# Patient Record
Sex: MALE | Race: WHITE | Employment: FULL TIME | URBAN - METROPOLITAN AREA
[De-identification: names, ages, dates, MRNs, and addresses within clinical notes are randomized per-mention and may not be internally consistent; named-entity substitution may affect disease eponyms.]

---

## 2022-08-10 LAB
AVERAGE GLUCOSE: NORMAL
HBA1C MFR BLD: 6.8 %

## 2022-09-09 ENCOUNTER — INITIAL CONSULT (OUTPATIENT)
Dept: CARDIOLOGY CLINIC | Age: 67
End: 2022-09-09
Payer: MEDICARE

## 2022-09-09 VITALS
SYSTOLIC BLOOD PRESSURE: 140 MMHG | BODY MASS INDEX: 31.98 KG/M2 | HEART RATE: 86 BPM | WEIGHT: 211 LBS | HEIGHT: 68 IN | DIASTOLIC BLOOD PRESSURE: 80 MMHG

## 2022-09-09 DIAGNOSIS — Z76.89 ESTABLISHING CARE WITH NEW DOCTOR, ENCOUNTER FOR: Primary | ICD-10-CM

## 2022-09-09 DIAGNOSIS — E78.5 HYPERLIPIDEMIA, UNSPECIFIED HYPERLIPIDEMIA TYPE: ICD-10-CM

## 2022-09-09 DIAGNOSIS — R07.9 CHEST PAIN, UNSPECIFIED TYPE: ICD-10-CM

## 2022-09-09 DIAGNOSIS — I10 PRIMARY HYPERTENSION: ICD-10-CM

## 2022-09-09 DIAGNOSIS — E11.9 TYPE 2 DIABETES MELLITUS WITHOUT COMPLICATION, WITHOUT LONG-TERM CURRENT USE OF INSULIN (HCC): ICD-10-CM

## 2022-09-09 DIAGNOSIS — R01.1 MURMUR, CARDIAC: ICD-10-CM

## 2022-09-09 DIAGNOSIS — R06.09 DOE (DYSPNEA ON EXERTION): ICD-10-CM

## 2022-09-09 PROCEDURE — 3044F HG A1C LEVEL LT 7.0%: CPT | Performed by: INTERNAL MEDICINE

## 2022-09-09 PROCEDURE — 1123F ACP DISCUSS/DSCN MKR DOCD: CPT | Performed by: INTERNAL MEDICINE

## 2022-09-09 PROCEDURE — 99203 OFFICE O/P NEW LOW 30 MIN: CPT | Performed by: INTERNAL MEDICINE

## 2022-09-09 PROCEDURE — 93000 ELECTROCARDIOGRAM COMPLETE: CPT | Performed by: INTERNAL MEDICINE

## 2022-09-09 RX ORDER — DEXTROAMPHETAMINE SACCHARATE, AMPHETAMINE ASPARTATE, DEXTROAMPHETAMINE SULFATE AND AMPHETAMINE SULFATE 7.5; 7.5; 7.5; 7.5 MG/1; MG/1; MG/1; MG/1
15 TABLET ORAL DAILY
COMMUNITY

## 2022-09-09 RX ORDER — ATORVASTATIN CALCIUM 80 MG/1
80 TABLET, FILM COATED ORAL DAILY
COMMUNITY

## 2022-09-09 RX ORDER — ASPIRIN 81 MG/1
81 TABLET, CHEWABLE ORAL DAILY
COMMUNITY

## 2022-09-09 RX ORDER — LISINOPRIL 10 MG/1
10 TABLET ORAL DAILY
COMMUNITY

## 2022-09-09 RX ORDER — VALACYCLOVIR HYDROCHLORIDE 500 MG/1
500 TABLET, FILM COATED ORAL PRN
COMMUNITY

## 2022-09-09 ASSESSMENT — ENCOUNTER SYMPTOMS
BLURRED VISION: 0
DIARRHEA: 0
COLOR CHANGE: 0
HEMATOCHEZIA: 0
HOARSE VOICE: 0
ABDOMINAL PAIN: 0
SPUTUM PRODUCTION: 0
SWOLLEN GLANDS: 0
NAUSEA: 0
SHORTNESS OF BREATH: 1
BOWEL INCONTINENCE: 0
WHEEZING: 0
COUGH: 0
HEMATEMESIS: 0
VOMITING: 0
ORTHOPNEA: 0
BACK PAIN: 0
RHINORRHEA: 0
SORE THROAT: 0
HEMOPTYSIS: 0

## 2022-09-09 NOTE — PROGRESS NOTES
[facility] CARDIOLOGY  [address], [address]  PHONE: [phone]        22    NAME:  Melanie Blanchard  : 1955  MRN: [id number]       SUBJECTIVE:   Melanie Blanchard is a 78 y.o. male seen for a consultation visit regarding the following: The patient is referred by Dr Yesenia Howe for evaluation of chest pain and LANDRUM. He reports symptoms began after COVID infection in 2022 and may have worsened after his wife death in 2022. Recent echo at [facility] on 22 describes normal LV EF(55-65%),mild LVH,aortic sclerosis ,and trace MR &TR. Chief Complaint   Patient presents with    Consultation    Shortness of Breath    Chest Pain            HPI:  Consultation is requested by [unfilled] for evaluation of Consultation, Shortness of Breath, and Chest Pain   . Shortness of Breath  This is a new problem. Episode onset: 2022. Post COVID. The problem occurs every several days. The problem has been unchanged. Duration: variable. Associated symptoms include chest pain. Pertinent negatives include no abdominal pain, claudication, coryza, ear pain, fever, headaches, hemoptysis, leg pain, leg swelling, neck pain, orthopnea, PND, rash, rhinorrhea, sore throat, sputum production, swollen glands, syncope, vomiting or wheezing. The symptoms are aggravated by exercise. Chest Pain   This is a new problem. Episode onset: 2022. Post COVID. The onset quality is gradual. The problem occurs intermittently. The problem has been unchanged. The pain is present in the substernal region. The pain is at a severity of 1/10. The pain is mild. Quality: ache. The pain does not radiate. Associated symptoms include exertional chest pressure, malaise/fatigue and shortness of breath.  Pertinent negatives include no abdominal pain, back pain, claudication, cough, diaphoresis, dizziness, fever, headaches, hemoptysis, irregular heartbeat, leg pain, lower extremity edema, nausea, near-syncope, numbness, orthopnea, palpitations, PND, sputum production, syncope, vomiting or weakness. The pain is aggravated by exertion. He has tried nothing for the symptoms. Past Medical History, Past Surgical History, Family history, Social History, and Medications were all reviewed with the patient today and updated as necessary. Allergies   Allergen Reactions    Penicillins        Current Outpatient Medications:     metFORMIN (GLUCOPHAGE) 500 MG tablet, Take 500 mg by mouth 2 times daily (with meals), Disp: , Rfl:     diclofenac (VOLTAREN) 50 MG EC tablet, Take 50 mg by mouth 3 times daily, Disp: , Rfl:     aspirin 81 MG chewable tablet, Take 81 mg by mouth daily, Disp: , Rfl:     lisinopril (PRINIVIL;ZESTRIL) 10 MG tablet, Take 10 mg by mouth daily, Disp: , Rfl:     amphetamine-dextroamphetamine (ADDERALL) 30 MG tablet, Take 15 mg by mouth daily. , Disp: , Rfl:     valACYclovir (VALTREX) 500 MG tablet, Take 500 mg by mouth as needed, Disp: , Rfl:     atorvastatin (LIPITOR) 80 MG tablet, Take 80 mg by mouth daily, Disp: , Rfl:   Past Medical History:   Diagnosis Date    Diabetes mellitus ([facility] [address]     Hyperlipidemia     Hypertension      Past Surgical History:   Procedure Laterality Date    JOINT REPLACEMENT Right     SHOULDER ARTHROPLASTY Right      No family history on file. Social History     Tobacco Use    Smoking status: Not on file    Smokeless tobacco: Never   Substance Use Topics    Alcohol use: Not Currently       ROS:    Review of Systems   Constitutional: Positive for malaise/fatigue. Negative for chills, decreased appetite, diaphoresis and fever. HENT:  Negative for congestion, ear pain, hearing loss, hoarse voice, nosebleeds, rhinorrhea and sore throat. Eyes:  Negative for blurred vision. Cardiovascular:  Positive for chest pain and dyspnea on exertion.  Negative for claudication, cyanosis, irregular heartbeat, leg swelling, near-syncope, orthopnea, palpitations, paroxysmal nocturnal dyspnea and syncope. Respiratory:  Positive for shortness of breath. Negative for cough, hemoptysis, sputum production and wheezing. Endocrine: Negative for polydipsia, polyphagia and polyuria. Skin:  Negative for color change and rash. Musculoskeletal:  Negative for back pain and neck pain. Gastrointestinal:  Negative for abdominal pain, bowel incontinence, diarrhea, hematemesis, hematochezia, nausea and vomiting. Genitourinary:  Negative for dysuria, frequency and hematuria. Neurological:  Negative for dizziness, focal weakness, headaches, light-headedness, loss of balance, numbness, sensory change and weakness. Psychiatric/Behavioral:  Negative for altered mental status and memory loss. PHYSICAL EXAM:   BP (!) 140/80   Pulse 86   Ht 5' 8\" (1.727 m)   Wt 211 lb (95.7 kg)   BMI 32.08 kg/m²      Physical Exam  Constitutional:       Appearance: Normal appearance. HENT:      Head: Normocephalic and atraumatic. Nose: Nose normal.   Eyes:      Extraocular Movements: Extraocular movements intact. Pupils: Pupils are equal, round, and reactive to light. Neck:      Vascular: No carotid bruit. Cardiovascular:      Rate and Rhythm: Regular rhythm. Pulses: Normal pulses. Heart sounds: Murmur (1/6 MATT) heard. Pulmonary:      Effort: Pulmonary effort is normal.      Breath sounds: Normal breath sounds. Abdominal:      General: Abdomen is flat. Bowel sounds are normal.      Palpations: Abdomen is soft. Musculoskeletal:         General: Normal range of motion. Cervical back: Normal range of motion and neck supple. Skin:     General: Skin is warm and dry. Neurological:      General: No focal deficit present. Mental Status: He is alert and oriented to person, place, and time. Psychiatric:         Mood and Affect: Mood normal.       Medical problems and test results were reviewed with the patient today.      Results for orders placed or performed in visit on 09/09/22 EKG 12 lead    Impression    Sinus  Rhythm   WITHIN NORMAL LIMITS               ASSESSMENT and PLAN    Paradise Newell was seen today for consultation, shortness of breath and chest pain. Diagnoses and all orders for this visit:    Establishing care with new doctor, encounter for  -     EKG 12 lead    LANDRUM (dyspnea on exertion)  -     Nuclear stress test with myocardial perfusion; Future    Primary hypertension:Mildly elevated. Continue Lisinopril. Consider increasing dose if BP dictates. Hyperlipidemia, unspecified hyperlipidemia type:Continue Lipitor    Chest pain, unspecified type  -     Nuclear stress test with myocardial perfusion; Future    Type 2 diabetes mellitus without complication, without long-term current use of insulin (HCC):per PCP    Murmur, cardiac:Recent echo shows nomal LV EF with mild aortic sclerosis and trivial MR &TR. No intervention is necessary at this time. Disposition:  Return for Follow up after Nuclear Stress Test.            Thank you for allowing me to participate in this patient's care. Please call or contact me if there are any questions or concerns regarding the above.       Ayan Simon MD  09/09/22  9:22 AM

## 2022-10-12 ENCOUNTER — OFFICE VISIT (OUTPATIENT)
Dept: CARDIOLOGY CLINIC | Age: 67
End: 2022-10-12
Payer: MEDICARE

## 2022-10-12 VITALS
SYSTOLIC BLOOD PRESSURE: 128 MMHG | BODY MASS INDEX: 31.77 KG/M2 | HEART RATE: 96 BPM | DIASTOLIC BLOOD PRESSURE: 70 MMHG | WEIGHT: 209.6 LBS | HEIGHT: 68 IN

## 2022-10-12 DIAGNOSIS — R06.09 DOE (DYSPNEA ON EXERTION): ICD-10-CM

## 2022-10-12 DIAGNOSIS — I10 PRIMARY HYPERTENSION: Primary | ICD-10-CM

## 2022-10-12 PROCEDURE — 99213 OFFICE O/P EST LOW 20 MIN: CPT | Performed by: INTERNAL MEDICINE

## 2022-10-12 PROCEDURE — 1123F ACP DISCUSS/DSCN MKR DOCD: CPT | Performed by: INTERNAL MEDICINE

## 2022-10-12 ASSESSMENT — ENCOUNTER SYMPTOMS
RHINORRHEA: 0
SHORTNESS OF BREATH: 1
VOMITING: 0
COLOR CHANGE: 0
ABDOMINAL PAIN: 0
BLURRED VISION: 0
HOARSE VOICE: 0
HEMOPTYSIS: 0
HEMATEMESIS: 0
ORTHOPNEA: 0
SWOLLEN GLANDS: 0
SPUTUM PRODUCTION: 0
BOWEL INCONTINENCE: 0
WHEEZING: 0
DIARRHEA: 0
SORE THROAT: 0
HEMATOCHEZIA: 0

## 2022-10-12 ASSESSMENT — PATIENT HEALTH QUESTIONNAIRE - PHQ9
SUM OF ALL RESPONSES TO PHQ QUESTIONS 1-9: 2
1. LITTLE INTEREST OR PLEASURE IN DOING THINGS: 1
SUM OF ALL RESPONSES TO PHQ QUESTIONS 1-9: 2
SUM OF ALL RESPONSES TO PHQ QUESTIONS 1-9: 2
2. FEELING DOWN, DEPRESSED OR HOPELESS: 1
SUM OF ALL RESPONSES TO PHQ9 QUESTIONS 1 & 2: 2
SUM OF ALL RESPONSES TO PHQ QUESTIONS 1-9: 2

## 2022-10-12 NOTE — PROGRESS NOTES
[facility] CARDIOLOGY  [address], [address]  PHONE: [phone]        10/12/22        NAME:  Babak Lares  : 1955  MRN: [id number]       SUBJECTIVE:   Babak Lares is a 78 y.o. male seen for a follow up visit regarding the following: Recently,the patient was referred for cardiac evaluation of LANDRUM and chest pain. Previous echo in 2022 reported normal LV EF with mild LVH,aortic sclerosis,and trivial MR &TR. Follow up stress MPI scan on 10/3/22 was normal ( normal perfusion and normal LV EF). He returns for follow up. I discussed MPI scan results. He continues to reports LANDRUM and fatigue since COVID URI in 2022 and death of his wife in 2022. Chief Complaint   Patient presents with    Hypertension    Shortness of Breath    Chest Pain    Results     Had Nuclear Stress Test       HPI:    Shortness of Breath  This is a chronic problem. The problem has been unchanged. Pertinent negatives include no abdominal pain, chest pain, claudication, coryza, ear pain, fever, headaches, hemoptysis, leg pain, leg swelling, neck pain, orthopnea, PND, rash, rhinorrhea, sore throat, sputum production, swollen glands, syncope, vomiting or wheezing. Past Medical History, Past Surgical History, Family history, Social History, and Medications were all reviewed with the patient today and updated as necessary. Current Outpatient Medications:     metFORMIN (GLUCOPHAGE) 500 MG tablet, Take 500 mg by mouth 2 times daily (with meals), Disp: , Rfl:     diclofenac (VOLTAREN) 50 MG EC tablet, Take 50 mg by mouth 3 times daily, Disp: , Rfl:     aspirin 81 MG chewable tablet, Take 81 mg by mouth daily, Disp: , Rfl:     lisinopril (PRINIVIL;ZESTRIL) 10 MG tablet, Take 10 mg by mouth daily, Disp: , Rfl:     amphetamine-dextroamphetamine (ADDERALL) 30 MG tablet, Take 15 mg by mouth daily. , Disp: , Rfl:     valACYclovir (VALTREX) 500 MG tablet, Take 500 mg by mouth as needed, Disp: , Rfl: atorvastatin (LIPITOR) 80 MG tablet, Take 80 mg by mouth daily, Disp: , Rfl:   Allergies   Allergen Reactions    Penicillins      Past Medical History:   Diagnosis Date    Diabetes mellitus ([address]     Hyperlipidemia     Hypertension      Past Surgical History:   Procedure Laterality Date    JOINT REPLACEMENT Right     SHOULDER ARTHROPLASTY Right      No family history on file. Social History     Tobacco Use    Smoking status: Never    Smokeless tobacco: Never   Substance Use Topics    Alcohol use: Not Currently       ROS:    Review of Systems   Constitutional: Positive for malaise/fatigue. Negative for chills, decreased appetite, diaphoresis and fever. HENT:  Negative for congestion, ear pain, hearing loss, hoarse voice, nosebleeds, rhinorrhea and sore throat. Eyes:  Negative for blurred vision. Cardiovascular:  Positive for dyspnea on exertion. Negative for chest pain, claudication, cyanosis, irregular heartbeat, leg swelling, near-syncope, orthopnea, palpitations, paroxysmal nocturnal dyspnea and syncope. Respiratory:  Positive for shortness of breath. Negative for hemoptysis, sputum production and wheezing. Endocrine: Negative for polydipsia, polyphagia and polyuria. Skin:  Negative for color change and rash. Musculoskeletal:  Negative for neck pain. Gastrointestinal:  Negative for abdominal pain, bowel incontinence, diarrhea, hematemesis, hematochezia and vomiting. Genitourinary:  Negative for dysuria, frequency and hematuria. Neurological:  Negative for focal weakness, headaches, light-headedness, loss of balance, numbness, sensory change and weakness. Psychiatric/Behavioral:  Negative for altered mental status and memory loss. PHYSICAL EXAM:   /70   Pulse 96   Ht 5' 8\" (1.727 m)   Wt 209 lb 9.6 oz (95.1 kg) Comment: with shoes  BMI 31.87 kg/m²      Physical Exam  Constitutional:       Appearance: Normal appearance. HENT:      Head: Normocephalic and atraumatic. Nose: Nose normal.   Eyes:      Extraocular Movements: Extraocular movements intact. Pupils: Pupils are equal, round, and reactive to light. Neck:      Vascular: No carotid bruit. Cardiovascular:      Rate and Rhythm: Regular rhythm. Pulses: Normal pulses. Heart sounds: Murmur (1/6 MATT) heard. Pulmonary:      Effort: Pulmonary effort is normal.      Breath sounds: Normal breath sounds. Abdominal:      General: Abdomen is flat. Bowel sounds are normal.      Palpations: Abdomen is soft. Musculoskeletal:         General: Normal range of motion. Cervical back: Normal range of motion and neck supple. Skin:     General: Skin is warm and dry. Neurological:      General: No focal deficit present. Mental Status: He is alert and oriented to person, place, and time. Psychiatric:         Mood and Affect: Mood normal.       Medical problems and test results were reviewed with the patient today. Recent Results (from the past 672 hour(s))   Nuclear stress test with myocardial perfusion    Collection Time: 10/03/22 11:16 AM   Result Value Ref Range    Stress Target  bpm    Baseline HR 76 bpm    Baseline Systolic  mmHg    Baseline Diastolic BP 90 mmHg    Stress Peak  bpm    Stress Systolic  mmHg    Stress Diastolic BP 78 mmHg    Stress Rate Pressure Product 28,500 bpm*mmHg    Stress Percent HR Achieved 98 %    Exercise Duration Time 7 min    Exercuse Duration Seconds 56 sec    Stress Estimated Workload 10.2 METS    Angina Index 0     Body Surface Area 2.14 m2    Baseline ST Depression 0 mm    Nuc Stress EF 65 %     No results found for: CHOL, CHOLPOCT, CHOLX, CHLST, CHOLV, HDL, HDLPOC, HDLC, LDL, LDLC, VLDLC, VLDL, TGLX, TRIGL  No results found for any visits on 10/12/22. ASSESSMENT and PLAN    Nikita Fernandez was seen today for hypertension, shortness of breath, chest pain and results.     Diagnoses and all orders for this visit:    Primary hypertension:Stable on Lisinopril. LANDRUM (dyspnea on exertion):No cardiac explanation with negative echo and normal MPI scan. He states that his PCP is considering treatment for depression. He will try this and consider definitive LHC in future if symptoms persists? Disposition:    Return in about 3 months (around 1/12/2023).                 Ian Cruz MD  10/12/2022  8:59 AM

## 2023-01-27 ENCOUNTER — OFFICE VISIT (OUTPATIENT)
Dept: CARDIOLOGY CLINIC | Age: 68
End: 2023-01-27
Payer: MEDICARE

## 2023-01-27 VITALS
HEIGHT: 68 IN | BODY MASS INDEX: 31.93 KG/M2 | HEART RATE: 94 BPM | SYSTOLIC BLOOD PRESSURE: 150 MMHG | WEIGHT: 210.7 LBS | DIASTOLIC BLOOD PRESSURE: 90 MMHG

## 2023-01-27 DIAGNOSIS — I10 PRIMARY HYPERTENSION: Primary | ICD-10-CM

## 2023-01-27 PROCEDURE — 1123F ACP DISCUSS/DSCN MKR DOCD: CPT | Performed by: INTERNAL MEDICINE

## 2023-01-27 PROCEDURE — 99213 OFFICE O/P EST LOW 20 MIN: CPT | Performed by: INTERNAL MEDICINE

## 2023-01-27 PROCEDURE — 3077F SYST BP >= 140 MM HG: CPT | Performed by: INTERNAL MEDICINE

## 2023-01-27 PROCEDURE — 3080F DIAST BP >= 90 MM HG: CPT | Performed by: INTERNAL MEDICINE

## 2023-01-27 RX ORDER — LISINOPRIL 20 MG/1
20 TABLET ORAL DAILY
Qty: 90 TABLET | Refills: 3 | Status: SHIPPED | OUTPATIENT
Start: 2023-01-27

## 2023-01-27 ASSESSMENT — ENCOUNTER SYMPTOMS
ORTHOPNEA: 0
ABDOMINAL PAIN: 0
COLOR CHANGE: 0
DIARRHEA: 0
BLURRED VISION: 0
HEMATEMESIS: 0
HEMATOCHEZIA: 0
SPUTUM PRODUCTION: 0
HOARSE VOICE: 0
WHEEZING: 0
BOWEL INCONTINENCE: 0
SHORTNESS OF BREATH: 0

## 2023-01-27 NOTE — PROGRESS NOTES
[facility] CARDIOLOGY  [address], [address]  PHONE: [phone]        23        NAME:  Horace Mayo  : 1955  MRN: [id number]       SUBJECTIVE:   Horace Mayo is a 75 y.o. male seen for a follow up visit regarding the following: Previously,the patient was evaluated for unexplained chest pain and LANDRUM. He had a normal MPI scan and unremarkable echo. He returns for follow up. Presently,he is complaining of right shoulder pain. Chief Complaint   Patient presents with    Hypertension    3 Month Follow-Up    Hyperlipidemia       HPI:    Hypertension  This is a chronic problem. The problem has been gradually worsening since onset. Associated symptoms include malaise/fatigue. Pertinent negatives include no anxiety, blurred vision, chest pain, headaches, neck pain, orthopnea, palpitations, peripheral edema, PND, shortness of breath or sweats. Hyperlipidemia  Pertinent negatives include no chest pain, focal weakness or shortness of breath. Past Medical History, Past Surgical History, Family history, Social History, and Medications were all reviewed with the patient today and updated as necessary. Current Outpatient Medications:     lisinopril (PRINIVIL;ZESTRIL) 20 MG tablet, Take 1 tablet by mouth daily, Disp: 90 tablet, Rfl: 3    metFORMIN (GLUCOPHAGE) 500 MG tablet, Take 500 mg by mouth 2 times daily (with meals), Disp: , Rfl:     diclofenac (VOLTAREN) 50 MG EC tablet, Take 50 mg by mouth 3 times daily, Disp: , Rfl:     aspirin 81 MG chewable tablet, Take 81 mg by mouth daily, Disp: , Rfl:     amphetamine-dextroamphetamine (ADDERALL) 30 MG tablet, Take 15 mg by mouth daily. , Disp: , Rfl:     valACYclovir (VALTREX) 500 MG tablet, Take 500 mg by mouth as needed, Disp: , Rfl:     atorvastatin (LIPITOR) 80 MG tablet, Take 80 mg by mouth daily, Disp: , Rfl:   Allergies   Allergen Reactions    Penicillins      Past Medical History:   Diagnosis Date    Diabetes mellitus ([facility] [address]     Hyperlipidemia     Hypertension      Past Surgical History:   Procedure Laterality Date    JOINT REPLACEMENT Right     SHOULDER ARTHROPLASTY Right      History reviewed. No pertinent family history. Social History     Tobacco Use    Smoking status: Never    Smokeless tobacco: Never   Substance Use Topics    Alcohol use: Not Currently       ROS:    Review of Systems   Constitutional: Positive for malaise/fatigue. Negative for chills, decreased appetite, diaphoresis and fever. HENT:  Negative for congestion, hearing loss, hoarse voice and nosebleeds. Eyes:  Negative for blurred vision. Cardiovascular:  Negative for chest pain, claudication, cyanosis, dyspnea on exertion, irregular heartbeat, leg swelling, near-syncope, orthopnea, palpitations, paroxysmal nocturnal dyspnea and syncope. Respiratory:  Negative for shortness of breath, sputum production and wheezing. Endocrine: Negative for polydipsia, polyphagia and polyuria. Skin:  Negative for color change. Musculoskeletal:  Positive for joint pain. Negative for neck pain. Gastrointestinal:  Negative for abdominal pain, bowel incontinence, diarrhea, hematemesis and hematochezia. Genitourinary:  Negative for dysuria, frequency and hematuria. Neurological:  Negative for focal weakness, headaches, light-headedness, loss of balance, numbness, sensory change and weakness. Psychiatric/Behavioral:  Negative for altered mental status and memory loss. PHYSICAL EXAM:   BP (!) 150/90   Pulse 94   Ht 5' 8\" (1.727 m)   Wt 210 lb 11.2 oz (95.6 kg)   BMI 32.04 kg/m²      Physical Exam  Constitutional:       Appearance: Normal appearance. HENT:      Head: Normocephalic and atraumatic. Nose: Nose normal.   Eyes:      Extraocular Movements: Extraocular movements intact. Pupils: Pupils are equal, round, and reactive to light. Neck:      Vascular: No carotid bruit.    Cardiovascular:      Rate and Rhythm: Regular rhythm. Pulses: Normal pulses. Heart sounds: Murmur (1/6 MATT) heard. Pulmonary:      Effort: Pulmonary effort is normal.      Breath sounds: Normal breath sounds. Abdominal:      General: Abdomen is flat. Bowel sounds are normal.      Palpations: Abdomen is soft. Musculoskeletal:         General: Normal range of motion. Cervical back: Normal range of motion and neck supple. Skin:     General: Skin is warm and dry. Neurological:      General: No focal deficit present. Mental Status: He is alert and oriented to person, place, and time. Psychiatric:         Mood and Affect: Mood normal.       Medical problems and test results were reviewed with the patient today. No results found for this or any previous visit (from the past 672 hour(s)). No results found for: CHOL, CHOLPOCT, CHOLX, CHLST, CHOLV, HDL, HDLPOC, HDLC, LDL, LDLC, VLDLC, VLDL, TGLX, TRIGL  No results found for any visits on 01/27/23. ASSESSMENT and PLAN    Renita Costello was seen today for hypertension, 3 month follow-up and hyperlipidemia. Diagnoses and all orders for this visit:    Primary hypertension:Elevated above goal.Increase Lisinopril from 10 mg daily to 20 mg daily. Keep PCP follow up in 3/2023.  -     lisinopril (PRINIVIL;ZESTRIL) 20 MG tablet; Take 1 tablet by mouth daily        Disposition:    Return in about 6 months (around 7/27/2023) for Follow up after Med change.                 Blaise Villagran MD  1/27/2023  9:16 AM